# Patient Record
Sex: MALE | Race: WHITE | HISPANIC OR LATINO
[De-identification: names, ages, dates, MRNs, and addresses within clinical notes are randomized per-mention and may not be internally consistent; named-entity substitution may affect disease eponyms.]

---

## 2017-06-12 ENCOUNTER — APPOINTMENT (OUTPATIENT)
Dept: UROLOGY | Facility: CLINIC | Age: 67
End: 2017-06-12

## 2017-06-19 ENCOUNTER — APPOINTMENT (OUTPATIENT)
Dept: ULTRASOUND IMAGING | Facility: IMAGING CENTER | Age: 67
End: 2017-06-19

## 2017-06-19 ENCOUNTER — APPOINTMENT (OUTPATIENT)
Dept: UROLOGY | Facility: CLINIC | Age: 67
End: 2017-06-19

## 2017-08-21 ENCOUNTER — OUTPATIENT (OUTPATIENT)
Dept: OUTPATIENT SERVICES | Facility: HOSPITAL | Age: 67
LOS: 1 days | End: 2017-08-21
Payer: MEDICARE

## 2017-08-21 ENCOUNTER — APPOINTMENT (OUTPATIENT)
Dept: UROLOGY | Facility: CLINIC | Age: 67
End: 2017-08-21
Payer: MEDICARE

## 2017-08-21 DIAGNOSIS — N20.0 CALCULUS OF KIDNEY: ICD-10-CM

## 2017-08-21 DIAGNOSIS — R35.0 FREQUENCY OF MICTURITION: ICD-10-CM

## 2017-08-21 PROCEDURE — 76775 US EXAM ABDO BACK WALL LIM: CPT

## 2017-08-21 PROCEDURE — 99213 OFFICE O/P EST LOW 20 MIN: CPT

## 2018-03-12 ENCOUNTER — APPOINTMENT (OUTPATIENT)
Dept: UROLOGY | Facility: CLINIC | Age: 68
End: 2018-03-12
Payer: MEDICARE

## 2018-03-12 ENCOUNTER — OUTPATIENT (OUTPATIENT)
Dept: OUTPATIENT SERVICES | Facility: HOSPITAL | Age: 68
LOS: 1 days | End: 2018-03-12
Payer: MEDICARE

## 2018-03-12 VITALS
BODY MASS INDEX: 34.55 KG/M2 | SYSTOLIC BLOOD PRESSURE: 133 MMHG | DIASTOLIC BLOOD PRESSURE: 84 MMHG | RESPIRATION RATE: 17 BRPM | WEIGHT: 215 LBS | HEIGHT: 66 IN | HEART RATE: 76 BPM | TEMPERATURE: 98.1 F

## 2018-03-12 DIAGNOSIS — R35.0 FREQUENCY OF MICTURITION: ICD-10-CM

## 2018-03-12 PROCEDURE — 76775 US EXAM ABDO BACK WALL LIM: CPT

## 2018-03-12 PROCEDURE — 99213 OFFICE O/P EST LOW 20 MIN: CPT | Mod: 25

## 2018-03-12 PROCEDURE — 76775 US EXAM ABDO BACK WALL LIM: CPT | Mod: 26

## 2018-03-14 DIAGNOSIS — N20.0 CALCULUS OF KIDNEY: ICD-10-CM

## 2018-03-14 LAB — PSA SERPL-MCNC: 2.24 NG/ML

## 2018-09-17 ENCOUNTER — OUTPATIENT (OUTPATIENT)
Dept: OUTPATIENT SERVICES | Facility: HOSPITAL | Age: 68
LOS: 1 days | End: 2018-09-17
Payer: MEDICARE

## 2018-09-17 ENCOUNTER — APPOINTMENT (OUTPATIENT)
Dept: UROLOGY | Facility: CLINIC | Age: 68
End: 2018-09-17
Payer: MEDICARE

## 2018-09-17 DIAGNOSIS — R35.0 FREQUENCY OF MICTURITION: ICD-10-CM

## 2018-09-17 PROCEDURE — 99213 OFFICE O/P EST LOW 20 MIN: CPT | Mod: 25

## 2018-09-17 PROCEDURE — 76775 US EXAM ABDO BACK WALL LIM: CPT | Mod: 26

## 2018-09-17 PROCEDURE — 76775 US EXAM ABDO BACK WALL LIM: CPT

## 2018-09-19 DIAGNOSIS — N20.0 CALCULUS OF KIDNEY: ICD-10-CM

## 2019-03-18 ENCOUNTER — APPOINTMENT (OUTPATIENT)
Dept: UROLOGY | Facility: CLINIC | Age: 69
End: 2019-03-18

## 2019-06-18 ENCOUNTER — FORM ENCOUNTER (OUTPATIENT)
Age: 69
End: 2019-06-18

## 2019-06-19 ENCOUNTER — OUTPATIENT (OUTPATIENT)
Dept: OUTPATIENT SERVICES | Facility: HOSPITAL | Age: 69
LOS: 1 days | End: 2019-06-19

## 2019-06-19 ENCOUNTER — APPOINTMENT (OUTPATIENT)
Dept: UROLOGY | Facility: CLINIC | Age: 69
End: 2019-06-19
Payer: MEDICARE

## 2019-06-19 ENCOUNTER — APPOINTMENT (OUTPATIENT)
Dept: CT IMAGING | Facility: IMAGING CENTER | Age: 69
End: 2019-06-19
Payer: MEDICARE

## 2019-06-19 ENCOUNTER — OUTPATIENT (OUTPATIENT)
Dept: OUTPATIENT SERVICES | Facility: HOSPITAL | Age: 69
LOS: 1 days | End: 2019-06-19
Payer: MEDICARE

## 2019-06-19 VITALS
HEIGHT: 66 IN | TEMPERATURE: 97.9 F | BODY MASS INDEX: 37.77 KG/M2 | WEIGHT: 235 LBS | SYSTOLIC BLOOD PRESSURE: 130 MMHG | RESPIRATION RATE: 17 BRPM | HEART RATE: 66 BPM | DIASTOLIC BLOOD PRESSURE: 73 MMHG

## 2019-06-19 DIAGNOSIS — N13.8 BENIGN PROSTATIC HYPERPLASIA WITH LOWER URINARY TRACT SYMPMS: ICD-10-CM

## 2019-06-19 DIAGNOSIS — R35.0 FREQUENCY OF MICTURITION: ICD-10-CM

## 2019-06-19 DIAGNOSIS — N20.0 CALCULUS OF KIDNEY: ICD-10-CM

## 2019-06-19 DIAGNOSIS — N40.1 BENIGN PROSTATIC HYPERPLASIA WITH LOWER URINARY TRACT SYMPMS: ICD-10-CM

## 2019-06-19 PROCEDURE — 74176 CT ABD & PELVIS W/O CONTRAST: CPT

## 2019-06-19 PROCEDURE — 76775 US EXAM ABDO BACK WALL LIM: CPT

## 2019-06-19 PROCEDURE — 99213 OFFICE O/P EST LOW 20 MIN: CPT | Mod: 25

## 2019-06-19 PROCEDURE — 76775 US EXAM ABDO BACK WALL LIM: CPT | Mod: 26

## 2019-06-19 PROCEDURE — 74176 CT ABD & PELVIS W/O CONTRAST: CPT | Mod: 26

## 2019-06-19 NOTE — ADDENDUM
[FreeTextEntry1] :  I, Maryam Jimenez, acted solely as a scribe for Dr. Andrew Duran. The documentation recorded by the scribe accurately reflects the service I personally performed and the decision by me.\par

## 2019-06-19 NOTE — HISTORY OF PRESENT ILLNESS
[FreeTextEntry1] : 68 year old male presents for evaluation of test results.\par US renal today shows multiple renal calculi visualized in the right kidney. There is a stone in the midpole measuring 9.9 mm, one in the upper pole measuring 4.4 mm, and another in the lower pole measuring 4.6 mm. He has bilateral simple cysts as well of no significance.\par LL from 05/22/19 shows good output, but his Ca levels have increased and he is SS CaP.\par On K Citrate 1 tablet BID\par CT scan today shows an 8.5 mm in the right mid-upper pole in addition to 2 stones measuring 6-7 mm each in the left mid pole.\par \par Post TURP 7 years ago.\par Nocturia x0, occasionally x1\par [place].\par Occasional urgency.\par PSA 2.24 in March 2018\par \par Repeat PSA today\par Risks, benefits and alternatives of URS discussed. \par Risk of infection, multiple procedures, ureteral injury, ureteral stricture, incomplete stones clearance, sepsis, bleeding, retention, all discussed. \par Will schedule right URS\par Give repeat LL form on return visit.

## 2019-06-20 LAB — PSA SERPL-MCNC: 2.18 NG/ML

## 2019-06-21 ENCOUNTER — OUTPATIENT (OUTPATIENT)
Dept: OUTPATIENT SERVICES | Facility: HOSPITAL | Age: 69
LOS: 1 days | End: 2019-06-21
Payer: MEDICARE

## 2019-06-21 VITALS
OXYGEN SATURATION: 96 % | DIASTOLIC BLOOD PRESSURE: 86 MMHG | WEIGHT: 225.09 LBS | HEIGHT: 66 IN | TEMPERATURE: 98 F | SYSTOLIC BLOOD PRESSURE: 147 MMHG | RESPIRATION RATE: 16 BRPM | HEART RATE: 88 BPM

## 2019-06-21 DIAGNOSIS — N40.1 BENIGN PROSTATIC HYPERPLASIA WITH LOWER URINARY TRACT SYMPTOMS: ICD-10-CM

## 2019-06-21 DIAGNOSIS — N20.0 CALCULUS OF KIDNEY: ICD-10-CM

## 2019-06-21 DIAGNOSIS — I25.10 ATHEROSCLEROTIC HEART DISEASE OF NATIVE CORONARY ARTERY WITHOUT ANGINA PECTORIS: ICD-10-CM

## 2019-06-21 DIAGNOSIS — E11.9 TYPE 2 DIABETES MELLITUS WITHOUT COMPLICATIONS: ICD-10-CM

## 2019-06-21 LAB
ANION GAP SERPL CALC-SCNC: 15 MMOL/L — SIGNIFICANT CHANGE UP (ref 5–17)
BUN SERPL-MCNC: 18 MG/DL — SIGNIFICANT CHANGE UP (ref 7–23)
CALCIUM SERPL-MCNC: 9.9 MG/DL — SIGNIFICANT CHANGE UP (ref 8.4–10.5)
CHLORIDE SERPL-SCNC: 101 MMOL/L — SIGNIFICANT CHANGE UP (ref 96–108)
CO2 SERPL-SCNC: 23 MMOL/L — SIGNIFICANT CHANGE UP (ref 22–31)
CREAT SERPL-MCNC: 0.86 MG/DL — SIGNIFICANT CHANGE UP (ref 0.5–1.3)
GLUCOSE SERPL-MCNC: 113 MG/DL — HIGH (ref 70–99)
HCT VFR BLD CALC: 44.4 % — SIGNIFICANT CHANGE UP (ref 39–50)
HGB BLD-MCNC: 14 G/DL — SIGNIFICANT CHANGE UP (ref 13–17)
MCHC RBC-ENTMCNC: 27.6 PG — SIGNIFICANT CHANGE UP (ref 27–34)
MCHC RBC-ENTMCNC: 31.5 GM/DL — LOW (ref 32–36)
MCV RBC AUTO: 87.4 FL — SIGNIFICANT CHANGE UP (ref 80–100)
PLATELET # BLD AUTO: 295 K/UL — SIGNIFICANT CHANGE UP (ref 150–400)
POTASSIUM SERPL-MCNC: 4.4 MMOL/L — SIGNIFICANT CHANGE UP (ref 3.5–5.3)
POTASSIUM SERPL-SCNC: 4.4 MMOL/L — SIGNIFICANT CHANGE UP (ref 3.5–5.3)
RBC # BLD: 5.08 M/UL — SIGNIFICANT CHANGE UP (ref 4.2–5.8)
RBC # FLD: 15 % — HIGH (ref 10.3–14.5)
SODIUM SERPL-SCNC: 139 MMOL/L — SIGNIFICANT CHANGE UP (ref 135–145)
WBC # BLD: 8.47 K/UL — SIGNIFICANT CHANGE UP (ref 3.8–10.5)
WBC # FLD AUTO: 8.47 K/UL — SIGNIFICANT CHANGE UP (ref 3.8–10.5)

## 2019-06-21 PROCEDURE — 83036 HEMOGLOBIN GLYCOSYLATED A1C: CPT

## 2019-06-21 PROCEDURE — 87086 URINE CULTURE/COLONY COUNT: CPT

## 2019-06-21 PROCEDURE — 85027 COMPLETE CBC AUTOMATED: CPT

## 2019-06-21 PROCEDURE — G0463: CPT

## 2019-06-21 PROCEDURE — 80048 BASIC METABOLIC PNL TOTAL CA: CPT

## 2019-06-21 NOTE — H&P PST ADULT - NSICDXPROBLEM_GEN_ALL_CORE_FT
PROBLEM DIAGNOSES  Problem: Renal calculi  Assessment and Plan: Right ureteroscopy for kidney stone  [facility] sent    Problem: CAD (coronary artery disease)  Assessment and Plan: Instructed to continue meds  asprin to OR    Problem: DM (diabetes mellitus)  Assessment and Plan: Will follow up with labs/ fingerstick.   HgA1c ordered   Instructed to hold metformin, last dose 07/06/19  STAT FS  on the day of surgery ordered
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
## 2019-06-21 NOTE — H&P PST ADULT - PRIMARY CARE PROVIDER
Reymundo gonzalez  , Cardio tari Beebe  , "my doctors are in [place] ( moved from [place] to [place] in 12/2018)
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
## 2019-06-21 NOTE — H&P PST ADULT - NSICDXPASTSURGICALHX_GEN_ALL_CORE_FT
PAST SURGICAL HISTORY:  Kidney stone s/o ureteroscopy, stone extraction    S/P TURP 2012    Stented coronary artery Drug eluting stent placed 1/28/11 in LAD

## 2019-06-21 NOTE — H&P PST ADULT - MUSCULOSKELETAL
details… detailed exam no joint erythema/no calf tenderness/left ankle torn meniscus, brace on/normal strength/decreased ROM due to pain/no joint warmth

## 2019-06-22 LAB
CULTURE RESULTS: NO GROWTH — SIGNIFICANT CHANGE UP
HBA1C BLD-MCNC: 6.2 % — HIGH (ref 4–5.6)
SPECIMEN SOURCE: SIGNIFICANT CHANGE UP

## 2019-06-27 DIAGNOSIS — N40.1 BENIGN PROSTATIC HYPERPLASIA WITH LOWER URINARY TRACT SYMPTOMS: ICD-10-CM

## 2019-06-27 DIAGNOSIS — N20.0 CALCULUS OF KIDNEY: ICD-10-CM

## 2019-07-08 ENCOUNTER — APPOINTMENT (OUTPATIENT)
Dept: UROLOGY | Facility: HOSPITAL | Age: 69
End: 2019-07-08

## 2019-09-03 ENCOUNTER — RX RENEWAL (OUTPATIENT)
Age: 69
End: 2019-09-03

## 2021-12-14 PROBLEM — E11.9 TYPE 2 DIABETES MELLITUS WITHOUT COMPLICATIONS: Chronic | Status: ACTIVE | Noted: 2019-06-21

## 2021-12-14 PROBLEM — K92.2 GASTROINTESTINAL HEMORRHAGE, UNSPECIFIED: Chronic | Status: ACTIVE | Noted: 2019-06-21

## 2021-12-14 PROBLEM — N20.0 CALCULUS OF KIDNEY: Chronic | Status: ACTIVE | Noted: 2019-06-21

## 2021-12-14 PROBLEM — T75.1XXA: Chronic | Status: ACTIVE | Noted: 2019-06-21

## 2021-12-14 PROBLEM — F41.8 OTHER SPECIFIED ANXIETY DISORDERS: Chronic | Status: ACTIVE | Noted: 2019-06-21

## 2021-12-14 PROBLEM — T14.8XXA OTHER INJURY OF UNSPECIFIED BODY REGION, INITIAL ENCOUNTER: Chronic | Status: ACTIVE | Noted: 2019-06-21

## 2022-01-24 ENCOUNTER — APPOINTMENT (OUTPATIENT)
Dept: UROLOGY | Facility: CLINIC | Age: 72
End: 2022-01-24

## 2022-12-11 ENCOUNTER — INPATIENT (INPATIENT)
Facility: HOSPITAL | Age: 72
LOS: 0 days | Discharge: HOME | End: 2022-12-12
Attending: HOSPITALIST | Admitting: HOSPITALIST

## 2022-12-11 VITALS
OXYGEN SATURATION: 100 % | HEART RATE: 100 BPM | RESPIRATION RATE: 16 BRPM | SYSTOLIC BLOOD PRESSURE: 163 MMHG | TEMPERATURE: 98 F | DIASTOLIC BLOOD PRESSURE: 91 MMHG

## 2022-12-11 DIAGNOSIS — J10.1 INFLUENZA DUE TO OTHER IDENTIFIED INFLUENZA VIRUS WITH OTHER RESPIRATORY MANIFESTATIONS: ICD-10-CM

## 2022-12-11 DIAGNOSIS — M10.9 GOUT, UNSPECIFIED: ICD-10-CM

## 2022-12-11 DIAGNOSIS — F41.9 ANXIETY DISORDER, UNSPECIFIED: ICD-10-CM

## 2022-12-11 DIAGNOSIS — Z29.9 ENCOUNTER FOR PROPHYLACTIC MEASURES, UNSPECIFIED: ICD-10-CM

## 2022-12-11 DIAGNOSIS — J45.901 UNSPECIFIED ASTHMA WITH (ACUTE) EXACERBATION: ICD-10-CM

## 2022-12-11 DIAGNOSIS — I25.10 ATHEROSCLEROTIC HEART DISEASE OF NATIVE CORONARY ARTERY WITHOUT ANGINA PECTORIS: ICD-10-CM

## 2022-12-11 DIAGNOSIS — E11.9 TYPE 2 DIABETES MELLITUS WITHOUT COMPLICATIONS: ICD-10-CM

## 2022-12-11 LAB
ALBUMIN SERPL ELPH-MCNC: 4.3 G/DL — SIGNIFICANT CHANGE UP (ref 3.3–5)
ALP SERPL-CCNC: 102 U/L — SIGNIFICANT CHANGE UP (ref 40–120)
ALT FLD-CCNC: 16 U/L — SIGNIFICANT CHANGE UP (ref 4–41)
ANION GAP SERPL CALC-SCNC: 12 MMOL/L — SIGNIFICANT CHANGE UP (ref 7–14)
APTT BLD: 31.7 SEC — SIGNIFICANT CHANGE UP (ref 27–36.3)
AST SERPL-CCNC: 18 U/L — SIGNIFICANT CHANGE UP (ref 4–40)
B PERT DNA SPEC QL NAA+PROBE: SIGNIFICANT CHANGE UP
B PERT+PARAPERT DNA PNL SPEC NAA+PROBE: SIGNIFICANT CHANGE UP
BASE EXCESS BLDV CALC-SCNC: 0.5 MMOL/L — SIGNIFICANT CHANGE UP (ref -2–3)
BASOPHILS # BLD AUTO: 0.02 K/UL — SIGNIFICANT CHANGE UP (ref 0–0.2)
BASOPHILS NFR BLD AUTO: 0.3 % — SIGNIFICANT CHANGE UP (ref 0–2)
BILIRUB SERPL-MCNC: 0.3 MG/DL — SIGNIFICANT CHANGE UP (ref 0.2–1.2)
BLOOD GAS VENOUS COMPREHENSIVE RESULT: SIGNIFICANT CHANGE UP
BORDETELLA PARAPERTUSSIS (RAPRVP): SIGNIFICANT CHANGE UP
BUN SERPL-MCNC: 15 MG/DL — SIGNIFICANT CHANGE UP (ref 7–23)
C PNEUM DNA SPEC QL NAA+PROBE: SIGNIFICANT CHANGE UP
CALCIUM SERPL-MCNC: 9.5 MG/DL — SIGNIFICANT CHANGE UP (ref 8.4–10.5)
CHLORIDE BLDV-SCNC: 101 MMOL/L — SIGNIFICANT CHANGE UP (ref 96–108)
CHLORIDE SERPL-SCNC: 101 MMOL/L — SIGNIFICANT CHANGE UP (ref 98–107)
CO2 BLDV-SCNC: 27.4 MMOL/L — HIGH (ref 22–26)
CO2 SERPL-SCNC: 24 MMOL/L — SIGNIFICANT CHANGE UP (ref 22–31)
CREAT SERPL-MCNC: 0.96 MG/DL — SIGNIFICANT CHANGE UP (ref 0.5–1.3)
EGFR: 83 ML/MIN/1.73M2 — SIGNIFICANT CHANGE UP
EOSINOPHIL # BLD AUTO: 0.01 K/UL — SIGNIFICANT CHANGE UP (ref 0–0.5)
EOSINOPHIL NFR BLD AUTO: 0.1 % — SIGNIFICANT CHANGE UP (ref 0–6)
FLUAV H3 RNA SPEC QL NAA+PROBE: DETECTED
FLUBV RNA SPEC QL NAA+PROBE: SIGNIFICANT CHANGE UP
GAS PNL BLDV: 134 MMOL/L — LOW (ref 136–145)
GLUCOSE BLDC GLUCOMTR-MCNC: 107 MG/DL — HIGH (ref 70–99)
GLUCOSE BLDC GLUCOMTR-MCNC: 208 MG/DL — HIGH (ref 70–99)
GLUCOSE BLDV-MCNC: 162 MG/DL — HIGH (ref 70–99)
GLUCOSE SERPL-MCNC: 159 MG/DL — HIGH (ref 70–99)
HADV DNA SPEC QL NAA+PROBE: SIGNIFICANT CHANGE UP
HCO3 BLDV-SCNC: 26 MMOL/L — SIGNIFICANT CHANGE UP (ref 22–29)
HCOV 229E RNA SPEC QL NAA+PROBE: SIGNIFICANT CHANGE UP
HCOV HKU1 RNA SPEC QL NAA+PROBE: SIGNIFICANT CHANGE UP
HCOV NL63 RNA SPEC QL NAA+PROBE: SIGNIFICANT CHANGE UP
HCOV OC43 RNA SPEC QL NAA+PROBE: SIGNIFICANT CHANGE UP
HCT VFR BLD CALC: 48 % — SIGNIFICANT CHANGE UP (ref 39–50)
HCT VFR BLDA CALC: 47 % — SIGNIFICANT CHANGE UP (ref 39–51)
HGB BLD CALC-MCNC: 15.8 G/DL — SIGNIFICANT CHANGE UP (ref 13–17)
HGB BLD-MCNC: 15.5 G/DL — SIGNIFICANT CHANGE UP (ref 13–17)
HMPV RNA SPEC QL NAA+PROBE: SIGNIFICANT CHANGE UP
HPIV1 RNA SPEC QL NAA+PROBE: SIGNIFICANT CHANGE UP
HPIV2 RNA SPEC QL NAA+PROBE: SIGNIFICANT CHANGE UP
HPIV3 RNA SPEC QL NAA+PROBE: SIGNIFICANT CHANGE UP
HPIV4 RNA SPEC QL NAA+PROBE: SIGNIFICANT CHANGE UP
IANC: 5.81 K/UL — SIGNIFICANT CHANGE UP (ref 1.8–7.4)
IMM GRANULOCYTES NFR BLD AUTO: 0.4 % — SIGNIFICANT CHANGE UP (ref 0–0.9)
INR BLD: 1.22 RATIO — HIGH (ref 0.88–1.16)
LACTATE BLDV-MCNC: 1.2 MMOL/L — SIGNIFICANT CHANGE UP (ref 0.5–2)
LYMPHOCYTES # BLD AUTO: 0.56 K/UL — LOW (ref 1–3.3)
LYMPHOCYTES # BLD AUTO: 7.8 % — LOW (ref 13–44)
M PNEUMO DNA SPEC QL NAA+PROBE: SIGNIFICANT CHANGE UP
MCHC RBC-ENTMCNC: 28 PG — SIGNIFICANT CHANGE UP (ref 27–34)
MCHC RBC-ENTMCNC: 32.3 GM/DL — SIGNIFICANT CHANGE UP (ref 32–36)
MCV RBC AUTO: 86.6 FL — SIGNIFICANT CHANGE UP (ref 80–100)
MONOCYTES # BLD AUTO: 0.76 K/UL — SIGNIFICANT CHANGE UP (ref 0–0.9)
MONOCYTES NFR BLD AUTO: 10.6 % — SIGNIFICANT CHANGE UP (ref 2–14)
NEUTROPHILS # BLD AUTO: 5.81 K/UL — SIGNIFICANT CHANGE UP (ref 1.8–7.4)
NEUTROPHILS NFR BLD AUTO: 80.8 % — HIGH (ref 43–77)
NRBC # BLD: 0 /100 WBCS — SIGNIFICANT CHANGE UP (ref 0–0)
NRBC # FLD: 0 K/UL — SIGNIFICANT CHANGE UP (ref 0–0)
PCO2 BLDV: 44 MMHG — SIGNIFICANT CHANGE UP (ref 42–55)
PH BLDV: 7.38 — SIGNIFICANT CHANGE UP (ref 7.32–7.43)
PLATELET # BLD AUTO: 235 K/UL — SIGNIFICANT CHANGE UP (ref 150–400)
PO2 BLDV: 55 MMHG — SIGNIFICANT CHANGE UP
POTASSIUM BLDV-SCNC: 3.7 MMOL/L — SIGNIFICANT CHANGE UP (ref 3.5–5.1)
POTASSIUM SERPL-MCNC: 4 MMOL/L — SIGNIFICANT CHANGE UP (ref 3.5–5.3)
POTASSIUM SERPL-SCNC: 4 MMOL/L — SIGNIFICANT CHANGE UP (ref 3.5–5.3)
PROT SERPL-MCNC: 7 G/DL — SIGNIFICANT CHANGE UP (ref 6–8.3)
PROTHROM AB SERPL-ACNC: 14.2 SEC — HIGH (ref 10.5–13.4)
RAPID RVP RESULT: DETECTED
RBC # BLD: 5.54 M/UL — SIGNIFICANT CHANGE UP (ref 4.2–5.8)
RBC # FLD: 12.8 % — SIGNIFICANT CHANGE UP (ref 10.3–14.5)
RSV RNA SPEC QL NAA+PROBE: SIGNIFICANT CHANGE UP
RV+EV RNA SPEC QL NAA+PROBE: SIGNIFICANT CHANGE UP
SAO2 % BLDV: 85.3 % — SIGNIFICANT CHANGE UP
SARS-COV-2 RNA SPEC QL NAA+PROBE: SIGNIFICANT CHANGE UP
SODIUM SERPL-SCNC: 137 MMOL/L — SIGNIFICANT CHANGE UP (ref 135–145)
WBC # BLD: 7.19 K/UL — SIGNIFICANT CHANGE UP (ref 3.8–10.5)
WBC # FLD AUTO: 7.19 K/UL — SIGNIFICANT CHANGE UP (ref 3.8–10.5)

## 2022-12-11 PROCEDURE — 71045 X-RAY EXAM CHEST 1 VIEW: CPT | Mod: 26

## 2022-12-11 PROCEDURE — 99223 1ST HOSP IP/OBS HIGH 75: CPT

## 2022-12-11 PROCEDURE — 99285 EMERGENCY DEPT VISIT HI MDM: CPT

## 2022-12-11 PROCEDURE — 71275 CT ANGIOGRAPHY CHEST: CPT | Mod: 26,MA

## 2022-12-11 RX ORDER — ACETAMINOPHEN 500 MG
650 TABLET ORAL EVERY 6 HOURS
Refills: 0 | Status: DISCONTINUED | OUTPATIENT
Start: 2022-12-11 | End: 2022-12-12

## 2022-12-11 RX ORDER — VANCOMYCIN HCL 1 G
1000 VIAL (EA) INTRAVENOUS ONCE
Refills: 0 | Status: DISCONTINUED | OUTPATIENT
Start: 2022-12-11 | End: 2022-12-11

## 2022-12-11 RX ORDER — SODIUM CHLORIDE 9 MG/ML
1000 INJECTION, SOLUTION INTRAVENOUS
Refills: 0 | Status: DISCONTINUED | OUTPATIENT
Start: 2022-12-11 | End: 2022-12-12

## 2022-12-11 RX ORDER — DEXTROSE 50 % IN WATER 50 %
15 SYRINGE (ML) INTRAVENOUS ONCE
Refills: 0 | Status: DISCONTINUED | OUTPATIENT
Start: 2022-12-11 | End: 2022-12-12

## 2022-12-11 RX ORDER — ACETAMINOPHEN 500 MG
1000 TABLET ORAL ONCE
Refills: 0 | Status: COMPLETED | OUTPATIENT
Start: 2022-12-11 | End: 2022-12-11

## 2022-12-11 RX ORDER — DEXTROSE 50 % IN WATER 50 %
25 SYRINGE (ML) INTRAVENOUS ONCE
Refills: 0 | Status: DISCONTINUED | OUTPATIENT
Start: 2022-12-11 | End: 2022-12-12

## 2022-12-11 RX ORDER — GLUCAGON INJECTION, SOLUTION 0.5 MG/.1ML
1 INJECTION, SOLUTION SUBCUTANEOUS ONCE
Refills: 0 | Status: DISCONTINUED | OUTPATIENT
Start: 2022-12-11 | End: 2022-12-12

## 2022-12-11 RX ORDER — POTASSIUM CITRATE MONOHYDRATE 100 %
1 POWDER (GRAM) MISCELLANEOUS
Qty: 0 | Refills: 0 | DISCHARGE

## 2022-12-11 RX ORDER — ESCITALOPRAM OXALATE 10 MG/1
1 TABLET, FILM COATED ORAL
Qty: 0 | Refills: 0 | DISCHARGE

## 2022-12-11 RX ORDER — ASPIRIN/CALCIUM CARB/MAGNESIUM 324 MG
1 TABLET ORAL
Qty: 0 | Refills: 0 | DISCHARGE

## 2022-12-11 RX ORDER — OMEGA-3 ACID ETHYL ESTERS 1 G
0 CAPSULE ORAL
Qty: 0 | Refills: 0 | DISCHARGE

## 2022-12-11 RX ORDER — DEXTROSE 50 % IN WATER 50 %
12.5 SYRINGE (ML) INTRAVENOUS ONCE
Refills: 0 | Status: DISCONTINUED | OUTPATIENT
Start: 2022-12-11 | End: 2022-12-12

## 2022-12-11 RX ORDER — POTASSIUM CITRATE MONOHYDRATE 100 %
2 POWDER (GRAM) MISCELLANEOUS
Qty: 0 | Refills: 0 | DISCHARGE

## 2022-12-11 RX ORDER — SODIUM CHLORIDE 9 MG/ML
1000 INJECTION, SOLUTION INTRAVENOUS ONCE
Refills: 0 | Status: COMPLETED | OUTPATIENT
Start: 2022-12-11 | End: 2022-12-11

## 2022-12-11 RX ORDER — AMLODIPINE BESYLATE 2.5 MG/1
5 TABLET ORAL DAILY
Refills: 0 | Status: DISCONTINUED | OUTPATIENT
Start: 2022-12-11 | End: 2022-12-12

## 2022-12-11 RX ORDER — AMLODIPINE BESYLATE 2.5 MG/1
1 TABLET ORAL
Qty: 0 | Refills: 0 | DISCHARGE

## 2022-12-11 RX ORDER — FLUTICASONE PROPIONATE 220 MCG
1 AEROSOL WITH ADAPTER (GRAM) INHALATION
Qty: 0 | Refills: 0 | DISCHARGE

## 2022-12-11 RX ORDER — INSULIN LISPRO 100/ML
VIAL (ML) SUBCUTANEOUS AT BEDTIME
Refills: 0 | Status: DISCONTINUED | OUTPATIENT
Start: 2022-12-11 | End: 2022-12-12

## 2022-12-11 RX ORDER — CEFTRIAXONE 500 MG/1
1000 INJECTION, POWDER, FOR SOLUTION INTRAMUSCULAR; INTRAVENOUS ONCE
Refills: 0 | Status: COMPLETED | OUTPATIENT
Start: 2022-12-11 | End: 2022-12-11

## 2022-12-11 RX ORDER — IPRATROPIUM/ALBUTEROL SULFATE 18-103MCG
3 AEROSOL WITH ADAPTER (GRAM) INHALATION EVERY 6 HOURS
Refills: 0 | Status: DISCONTINUED | OUTPATIENT
Start: 2022-12-11 | End: 2022-12-12

## 2022-12-11 RX ORDER — ASPIRIN/CALCIUM CARB/MAGNESIUM 324 MG
81 TABLET ORAL
Refills: 0 | Status: DISCONTINUED | OUTPATIENT
Start: 2022-12-11 | End: 2022-12-12

## 2022-12-11 RX ORDER — AZITHROMYCIN 500 MG/1
500 TABLET, FILM COATED ORAL ONCE
Refills: 0 | Status: COMPLETED | OUTPATIENT
Start: 2022-12-11 | End: 2022-12-11

## 2022-12-11 RX ORDER — ENOXAPARIN SODIUM 100 MG/ML
40 INJECTION SUBCUTANEOUS EVERY 24 HOURS
Refills: 0 | Status: DISCONTINUED | OUTPATIENT
Start: 2022-12-11 | End: 2022-12-12

## 2022-12-11 RX ORDER — SODIUM CHLORIDE 9 MG/ML
1000 INJECTION INTRAMUSCULAR; INTRAVENOUS; SUBCUTANEOUS
Refills: 0 | Status: DISCONTINUED | OUTPATIENT
Start: 2022-12-11 | End: 2022-12-12

## 2022-12-11 RX ORDER — INSULIN LISPRO 100/ML
VIAL (ML) SUBCUTANEOUS
Refills: 0 | Status: DISCONTINUED | OUTPATIENT
Start: 2022-12-11 | End: 2022-12-11

## 2022-12-11 RX ORDER — LOSARTAN POTASSIUM 100 MG/1
1 TABLET, FILM COATED ORAL
Qty: 0 | Refills: 0 | DISCHARGE

## 2022-12-11 RX ORDER — INSULIN LISPRO 100/ML
VIAL (ML) SUBCUTANEOUS AT BEDTIME
Refills: 0 | Status: DISCONTINUED | OUTPATIENT
Start: 2022-12-11 | End: 2022-12-11

## 2022-12-11 RX ORDER — INSULIN LISPRO 100/ML
VIAL (ML) SUBCUTANEOUS
Refills: 0 | Status: DISCONTINUED | OUTPATIENT
Start: 2022-12-11 | End: 2022-12-12

## 2022-12-11 RX ORDER — ESCITALOPRAM OXALATE 10 MG/1
20 TABLET, FILM COATED ORAL DAILY
Refills: 0 | Status: DISCONTINUED | OUTPATIENT
Start: 2022-12-11 | End: 2022-12-12

## 2022-12-11 RX ADMIN — Medication 400 MILLIGRAM(S): at 16:11

## 2022-12-11 RX ADMIN — AZITHROMYCIN 255 MILLIGRAM(S): 500 TABLET, FILM COATED ORAL at 12:53

## 2022-12-11 RX ADMIN — Medication 3 MILLILITER(S): at 17:38

## 2022-12-11 RX ADMIN — CEFTRIAXONE 100 MILLIGRAM(S): 500 INJECTION, POWDER, FOR SOLUTION INTRAMUSCULAR; INTRAVENOUS at 13:24

## 2022-12-11 RX ADMIN — SODIUM CHLORIDE 100 MILLILITER(S): 9 INJECTION INTRAMUSCULAR; INTRAVENOUS; SUBCUTANEOUS at 17:38

## 2022-12-11 RX ADMIN — SODIUM CHLORIDE 1000 MILLILITER(S): 9 INJECTION, SOLUTION INTRAVENOUS at 08:52

## 2022-12-11 RX ADMIN — AMLODIPINE BESYLATE 5 MILLIGRAM(S): 2.5 TABLET ORAL at 21:50

## 2022-12-11 RX ADMIN — ESCITALOPRAM OXALATE 20 MILLIGRAM(S): 10 TABLET, FILM COATED ORAL at 21:50

## 2022-12-11 RX ADMIN — Medication 125 MILLIGRAM(S): at 17:38

## 2022-12-11 RX ADMIN — Medication 3 MILLILITER(S): at 21:50

## 2022-12-11 NOTE — H&P ADULT - NS ATTEND AMEND GEN_ALL_CORE FT
73 yo male PMHx BPH (s/p TURP,) CAD (s/p stent 2011,) HTN, HLD, Asthma, DM2 (non-insulin dependent,) Kidneys Stones, Obesity and Gout p/w generalized weakness, cough and difficulty breathing. Wife at bedside that patient symptoms started on Friday and was having increase fatigue with associated productive cough. Patient with sick contacts as grandchildren have been tested positive for adenovirus. Wife endorse Tmax 102,7 at home and measure oxygen with pulse oximetry and  noted to be 84%, [facility] O2 documented at 91%. On presentation patient noted to have O2 saturations was given 4L O2, CTA negative for PE, only notable for atelectasis, no consolidations noted. RVP positive for Influenza A, patient and wife refused Tamiflu as do not believe in vaccines or antiviral mediations, prefer wholistic remedies.   Patient received 125 IV solumedrol and Duoneb and patient endorses improvement.    #Flu Positive   - Likely asthma exacerbation in setting of Influenza, received 125 IV solumedrol   - continue with 40mg prednisone for 5 days   - patient refusing Tamiflu at this time   - Bcx x2 pending, received ceftriaxone and azithromycin ED, CT scan with no consolidation or concern for pna  - will monitor off abx at this time, if patient decompensate can reconsider   - Duonebs q6hrs   - supportive management, monitor on pulse ox, wean O2 as tolerated       Rest of plan as stated above. Discussed with Guzman Desir NP

## 2022-12-11 NOTE — H&P ADULT - NSHPLABSRESULTS_GEN_ALL_CORE
Rapid RVP: +Influenza AH1  CXR: Bilateral mid lung field linear opacities, compatible with linear atelectasis or scarring. No focal consolidation otherwise.  CTPA: No pulmonary embolism to the segmental level.  EKG: NSR @ 97bpm QTC 487ms                        15.5   7.19  )-----------( 235      ( 11 Dec 2022 08:43 )             48.0     12-11    137  |  101  |  15  ----------------------------<  159<H>  4.0   |  24  |  0.96    Ca    9.5      11 Dec 2022 08:43    TPro  7.0  /  Alb  4.3  /  TBili  0.3  /  DBili  x   /  AST  18  /  ALT  16  /  AlkPhos  102  12-11        PT/INR - ( 11 Dec 2022 08:43 )   PT: 14.2 sec;   INR: 1.22 ratio         PTT - ( 11 Dec 2022 08:43 )  PTT:31.7 sec  LIVER FUNCTIONS - ( 11 Dec 2022 08:43 )  Alb: 4.3 g/dL / Pro: 7.0 g/dL / ALK PHOS: 102 U/L / ALT: 16 U/L / AST: 18 U/L / GGT: x           CAPILLARY BLOOD GLUCOSE      POCT Blood Glucose.: 107 mg/dL (11 Dec 2022 17:16)  POCT Blood Glucose.: 143 mg/dL (11 Dec 2022 07:40)

## 2022-12-11 NOTE — ED PROVIDER NOTE - PROGRESS NOTE DETAILS
Called patients pmd dr. daniel bhagat at [phone] at patients wife request prior to cta. Results thus far d/w him and he is in agreement with plan to proceed to cta for further eval of hypoxia. discussed this with his wife. Debbie Pollock PGY-3 patient having desatting episodes on RA down to 88%, on 2-4L as needed. will need admission  FLU A positive, wife refusing tamiflu, will hold off for now. Debbie Pollock PGY-3 patient having desatting episodes on RA down to 88%, on 2-4L as needed. will need admission  FLU A positive, wife declining tamiflu and tylenol, patient deferring to her decision, will hold off for now.  stable on o2-all results d/w them and agreeable to admission.

## 2022-12-11 NOTE — ED PROVIDER NOTE - PHYSICAL EXAMINATION
Vital signs reviewed  GENERAL: Patient nontoxic appearing, NAD  HEAD: NCAT  EYES: Anicteric  ENT: MMM  NECK: Supple, non tender  RESPIRATORY: clear lung sounds, slightly tachypnic to 25.   CARDIOVASCULAR: tachycardic s1s2  ABDOMEN: Soft. Nondistended. Nontender. No guarding or rebound. No CVA tenderness.  MUSCULOSKELETAL/EXTREMITIES: Brisk cap refill. 2+ radial pulses. No leg edema.  SKIN:  Warm and dry  NEURO: AAOx3. No gross FND.  PSYCHIATRIC: Cooperative. Affect appropriate.

## 2022-12-11 NOTE — ED ADULT TRIAGE NOTE - CHIEF COMPLAINT QUOTE
Pt c/o fever, cough SOB since friday. Pt fever tmax 102 at home. Pt has sick  contact at home. No complaints of chest pain, headache, nausea, dizziness, vomiting ,chills verbalized.. Pt c/o fever, cough SOB since friday. Pt fever tmax 102 at home. Pt has sick  contact at home. pt o2 sat 92% room air. sating 98 on 4l NC. No complaints of chest pain, headache, nausea, dizziness, vomiting ,chills verbalized..

## 2022-12-11 NOTE — H&P ADULT - PROBLEM SELECTOR PLAN 1
-pt refused Tamiflu  -Pt with Acute asthma exacerbation in a setting of Influenza: start IV Solumedrol 125mg x1, then IV SoluMedrol 40mg Q8h  -DuoNebs Q6h  -Tylenol PRN pain/fever  -s/p IV NS 1L bolus by ED  -c/w IV fluids hydration with NS @ 100cc/hr x10 hours.  -PT eval -pt refused Tamiflu  -Pt with Acute asthma exacerbation in a setting of Influenza: start IV Solumedrol 125mg x1, then IV  prednisone 40mg   -DuoNebs Q6h  -Tylenol PRN pain/fever  -s/p IV NS 1L bolus by ED  -c/w IV fluids hydration with NS @ 100cc/hr x10 hours.  -PT eval

## 2022-12-11 NOTE — H&P ADULT - PROBLEM SELECTOR PROBLEM 4
DM2 (diabetes mellitus, type 2) FAMILY HISTORY:  Father  Still living? Unknown  Family history of ischemic heart disease (IHD), Age at diagnosis: Age Unknown    Mother  Still living? Unknown  Family history of lung cancer, Age at diagnosis: Age Unknown

## 2022-12-11 NOTE — H&P ADULT - PROBLEM SELECTOR PLAN 2
-in a setting of Influenza  -IV Solumedrol 125mg x1, then IV SoluMedrol 40mg Q8h  -DuoNebs Q6h  -incentive spirometry  -monitor SpO2 Q4h  -VS Q6h  -supplemental O2 PRN -in a setting of Influenza  -IV Solumedrol 125mg x1, then prednisone 40mg   -DuoNebs Q6h  -incentive spirometry  -monitor SpO2 Q4h  -VS Q6h  -supplemental O2 PRN

## 2022-12-11 NOTE — H&P ADULT - ASSESSMENT
73 yo male PMHx BPH (s/p TURP,) CAD (s/p stent 2011,) HTN, HLD, Asthma, DM2 (non-insulin dependent,) Kidneys Stones, Obesity and Gout p/w generalized weakness, cough, fever, chills, malaise, anorexia and difficulty breathing, admitted to medicine for Influenza and Asthma Exacerbation.

## 2022-12-11 NOTE — ED PROVIDER NOTE - CLINICAL SUMMARY MEDICAL DECISION MAKING FREE TEXT BOX
72-year-old male presents with fever cough.  Satting 88 on room air started on 4 L nasal cannula tachypneic to 25 however not noticing any chest pain or shortness of breath lungs clear no leg edema.  Given exam concerning for sepsis secondary to viral or bacterial pulmonary cause versus PE versus ACS versus CHF.  Pending labs and likely admission given new oxygen requirement

## 2022-12-11 NOTE — H&P ADULT - HISTORY OF PRESENT ILLNESS
71 yo male PMHx BPH (s/p TURP,) CAD (s/p stent 2011,) HTN, HLD, Asthma, DM2 (non-insulin dependent,) Kidneys Stones, Obesity and Gout p/w generalized weakness, cough and difficulty breathing. Wife by bedside (former RN,) provided further collateral. Pt visiting [place] from [place], arrived last Monday. Last Tuesday pt was holding his grandchild who was sick with Adenovirus. On Friday pt developed productive cough with green phlegm, fever and chills. Last night pt developed SOB, malaise and anorexia (not eating/drinking anything.) Wife checked his SpO2 sat was 84% on room air. Called [facility] ambulance who checked his O2 sat to be 91% on RA and took him to [facility]. Pt denies headache, sore throat, chest pain, palpitations, wheezing, nausea, vomiting, diarrhea, or abdominal pain.     Pt never been vaccinated for COVID or Flu due to refusal.

## 2022-12-11 NOTE — ED ADULT NURSE NOTE - CHIEF COMPLAINT QUOTE
Pt c/o fever, cough SOB since friday. Pt fever tmax 102 at home. Pt has sick  contact at home. pt o2 sat 92% room air. sating 98 on 4l NC. No complaints of chest pain, headache, nausea, dizziness, vomiting ,chills verbalized..

## 2022-12-11 NOTE — ED PROVIDER NOTE - ATTENDING CONTRIBUTION TO CARE
GEN - NAD; well appearing; A+O x3   HEAD - NC/AT   EYES- PERRL, EOMI  ENT: Airway patent, mmm, Oral cavity and pharynx normal. No inflammation, swelling, exudate, or lesions.  NECK: Neck supple  PULMONARY - CTA b/l, symmetric breath sounds, mild tachypnea when speaking though able to speak in full sentences, comfortable at rest on o2.   CARDIAC -s1s2, RRR, no M,G,R  ABDOMEN - +BS, ND, NT, soft, no guarding, no rebound, no masses   BACK - no CVA tenderness, Normal  spine   EXTREMITIES - FROM, symmetric pulses, capillary refill < 2 seconds, no edema   SKIN - no rash or bruising   NEUROLOGIC - alert, speech clear, no focal deficits  PSYCH -nl mood/affect, nl insight. GEN - NAD; well appearing; A+O x3   HEAD - NC/AT   EYES- PERRL, EOMI  ENT: Airway patent, mmm, Oral cavity and pharynx normal. No inflammation, swelling, exudate, or lesions.  NECK: Neck supple  PULMONARY - CTA b/l, symmetric breath sounds, mild tachypnea when speaking though able to speak in full sentences, comfortable at rest on o2.   CARDIAC -s1s2, RRR, no M,G,R  ABDOMEN - +BS, ND, NT, soft, no guarding, no rebound, no masses   BACK - no CVA tenderness, Normal  spine   EXTREMITIES - FROM, symmetric pulses, capillary refill < 2 seconds, no edema   SKIN - no rash or bruising   NEUROLOGIC - alert, speech clear, no focal deficits  PSYCH -nl mood/affect, nl insight.  [id number]-old male presented to the ED with fevers cough generalized weakness in setting of recent sick contacts exposed to children with adenovirus over the last few days.  Patient states he himself did not feel short of breath but his wife who is a nurse noted he looked like he was having difficulty breathing so she took his oxygen saturation this morning and noted it to be in 80s.  Reports he had COVID a few months ago (not vaccinated) at which time he also had a low oxygen though has not had any issues with that since then.  No headache neck pain chest pain abdominal pain vomiting diarrhea dysuria edema.  On exam patient is AO 3, unlabored on O2 though O2 sat drops when O2 is removed, lungs are clear without any wheezing, mildly tachypneic when talking though was able to complete full sentences.  Extremities are warm and well-perfused, abdomen soft and nontender.  Plan for labs chest x-ray EKG telemetry monitoring continue O2 support evaluate for differential including but not limited to infections (viral versus pneumonia) electrolyte disturbances anemia organ dysfunction, plan discussed with patient and wife at bedside who are agreeable.  Initially discussed possibility of CTA given hypoxia to evaluate for PE however wife would like to wait first for chest x-ray and will decide after that.  Patient defers to wife's decision.

## 2022-12-11 NOTE — ED PROVIDER NOTE - NS ED ROS FT
Constitutional: +fever, chills.  Eyes:  No visual changes  ENMT:  No neck pain  Cardiac:  No chest pain  Respiratory:  +cough  GI:  No nausea, vomiting, diarrhea, abdominal pain.  :  No dysuria, hematuria  MS:  No back pain.  Neuro:  No headache or lightheadedness  Skin:  No skin rash  Except as documented in the HPI,  all other systems are negative.

## 2022-12-11 NOTE — H&P ADULT - NSHPSOCIALHISTORY_GEN_ALL_CORE
Pt , lives with spouse. Denies smoking, drinking or drugs. Former smoker 1ppd x5 years, quit>40 yrs ago. At baseline ambulates independently without assistive device for at least 3-4 blocks.

## 2022-12-11 NOTE — ED PROVIDER NOTE - OBJECTIVE STATEMENT
72 BPH, CAD, gout, HTN, HLD, kidney stones, obesity, stented coronary artery, DM (on metformin, controlled) on ASA and Plavix   c/o cough and feverx 2 days. +sick contacts. T-max of 103 at home has positive contacts for adenovirus at home brought into the ER today by wife due to having a low pulse ox reading at home.  Not vaccinated for COVID.  Has a history of asthma on albuterol no history of intubations in the past due to asthma  pt denies any cp, palpitations, sob, abdominal pain, v/d, dysuria, numbness, back pain

## 2022-12-11 NOTE — ED ADULT NURSE NOTE - OBJECTIVE STATEMENT
pt received spot 23. pt A+Ox3, c/o cough and feverx 2 days. +sick contacts. respirations even and unlabored. denies cp/sob/pain/discomfort. pt received spot 23. pt A+Ox3, c/o cough and fever x 2 days. +sick contacts. respirations even and unlabored. denies cp/sob/pain/discomfort. vs as noted. placed on cm. IVSL in place. labs sent. family at bedside. call bell within reach. will monitor.
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
## 2022-12-12 ENCOUNTER — TRANSCRIPTION ENCOUNTER (OUTPATIENT)
Age: 72
End: 2022-12-12

## 2022-12-12 VITALS
DIASTOLIC BLOOD PRESSURE: 78 MMHG | SYSTOLIC BLOOD PRESSURE: 145 MMHG | OXYGEN SATURATION: 95 % | RESPIRATION RATE: 16 BRPM | HEART RATE: 75 BPM | TEMPERATURE: 98 F

## 2022-12-12 LAB
A1C WITH ESTIMATED AVERAGE GLUCOSE RESULT: 6.3 % — HIGH (ref 4–5.6)
ANION GAP SERPL CALC-SCNC: 13 MMOL/L — SIGNIFICANT CHANGE UP (ref 7–14)
BUN SERPL-MCNC: 20 MG/DL — SIGNIFICANT CHANGE UP (ref 7–23)
CALCIUM SERPL-MCNC: 9.1 MG/DL — SIGNIFICANT CHANGE UP (ref 8.4–10.5)
CHLORIDE SERPL-SCNC: 103 MMOL/L — SIGNIFICANT CHANGE UP (ref 98–107)
CO2 SERPL-SCNC: 23 MMOL/L — SIGNIFICANT CHANGE UP (ref 22–31)
CREAT SERPL-MCNC: 0.74 MG/DL — SIGNIFICANT CHANGE UP (ref 0.5–1.3)
EGFR: 96 ML/MIN/1.73M2 — SIGNIFICANT CHANGE UP
ESTIMATED AVERAGE GLUCOSE: 134 — SIGNIFICANT CHANGE UP
GLUCOSE BLDC GLUCOMTR-MCNC: 173 MG/DL — HIGH (ref 70–99)
GLUCOSE BLDC GLUCOMTR-MCNC: 174 MG/DL — HIGH (ref 70–99)
GLUCOSE SERPL-MCNC: 237 MG/DL — HIGH (ref 70–99)
HCT VFR BLD CALC: 46.6 % — SIGNIFICANT CHANGE UP (ref 39–50)
HCV AB S/CO SERPL IA: 0.05 S/CO — SIGNIFICANT CHANGE UP (ref 0–0.99)
HCV AB SERPL-IMP: SIGNIFICANT CHANGE UP
HGB BLD-MCNC: 15.1 G/DL — SIGNIFICANT CHANGE UP (ref 13–17)
MAGNESIUM SERPL-MCNC: 1.8 MG/DL — SIGNIFICANT CHANGE UP (ref 1.6–2.6)
MCHC RBC-ENTMCNC: 28 PG — SIGNIFICANT CHANGE UP (ref 27–34)
MCHC RBC-ENTMCNC: 32.4 GM/DL — SIGNIFICANT CHANGE UP (ref 32–36)
MCV RBC AUTO: 86.5 FL — SIGNIFICANT CHANGE UP (ref 80–100)
NRBC # BLD: 0 /100 WBCS — SIGNIFICANT CHANGE UP (ref 0–0)
NRBC # FLD: 0 K/UL — SIGNIFICANT CHANGE UP (ref 0–0)
PLATELET # BLD AUTO: 224 K/UL — SIGNIFICANT CHANGE UP (ref 150–400)
POTASSIUM SERPL-MCNC: 3.9 MMOL/L — SIGNIFICANT CHANGE UP (ref 3.5–5.3)
POTASSIUM SERPL-SCNC: 3.9 MMOL/L — SIGNIFICANT CHANGE UP (ref 3.5–5.3)
RBC # BLD: 5.39 M/UL — SIGNIFICANT CHANGE UP (ref 4.2–5.8)
RBC # FLD: 12.8 % — SIGNIFICANT CHANGE UP (ref 10.3–14.5)
SODIUM SERPL-SCNC: 139 MMOL/L — SIGNIFICANT CHANGE UP (ref 135–145)
WBC # BLD: 4.36 K/UL — SIGNIFICANT CHANGE UP (ref 3.8–10.5)
WBC # FLD AUTO: 4.36 K/UL — SIGNIFICANT CHANGE UP (ref 3.8–10.5)

## 2022-12-12 PROCEDURE — 99239 HOSP IP/OBS DSCHRG MGMT >30: CPT

## 2022-12-12 RX ORDER — PANTOPRAZOLE SODIUM 20 MG/1
1 TABLET, DELAYED RELEASE ORAL
Qty: 7 | Refills: 0
Start: 2022-12-12 | End: 2022-12-18

## 2022-12-12 RX ORDER — METFORMIN HYDROCHLORIDE 850 MG/1
1 TABLET ORAL
Qty: 0 | Refills: 0 | DISCHARGE

## 2022-12-12 RX ORDER — ACETAMINOPHEN 500 MG
2 TABLET ORAL
Qty: 0 | Refills: 0 | DISCHARGE
Start: 2022-12-12

## 2022-12-12 RX ORDER — PANTOPRAZOLE SODIUM 20 MG/1
40 TABLET, DELAYED RELEASE ORAL
Refills: 0 | Status: DISCONTINUED | OUTPATIENT
Start: 2022-12-12 | End: 2022-12-12

## 2022-12-12 RX ADMIN — Medication 2: at 12:20

## 2022-12-12 RX ADMIN — AMLODIPINE BESYLATE 5 MILLIGRAM(S): 2.5 TABLET ORAL at 05:29

## 2022-12-12 RX ADMIN — ESCITALOPRAM OXALATE 20 MILLIGRAM(S): 10 TABLET, FILM COATED ORAL at 09:18

## 2022-12-12 RX ADMIN — Medication 3 MILLILITER(S): at 09:00

## 2022-12-12 RX ADMIN — Medication 81 MILLIGRAM(S): at 09:18

## 2022-12-12 RX ADMIN — Medication 40 MILLIGRAM(S): at 05:29

## 2022-12-12 RX ADMIN — Medication 1: at 09:17

## 2022-12-12 NOTE — DISCHARGE NOTE PROVIDER - CARE PROVIDER_API CALL
Primary Care Physician,   Please call and schedule outpatient follow up with your physician within 1 week for ongoing medical management  Phone: (   )    -  Fax: (   )    -  Follow Up Time: 1 week

## 2022-12-12 NOTE — DISCHARGE NOTE PROVIDER - PROVIDER TOKENS
FREE:[LAST:[Primary Care Physician],PHONE:[(   )    -],FAX:[(   )    -],ADDRESS:[Please call and schedule outpatient follow up with your physician within 1 week for ongoing medical management],FOLLOWUP:[1 week]]

## 2022-12-12 NOTE — PROGRESS NOTE ADULT - PROBLEM SELECTOR PLAN 2
-in a setting of Influenza  -IV Solumedrol 125mg x1, then prednisone 40mg, dc home on Prednisone 20mg x 3 more days   -incentive spirometry  -Ambulatory sats 92-95%, pt denies SOB

## 2022-12-12 NOTE — PATIENT PROFILE ADULT - FUNCTIONAL ASSESSMENT - BASIC MOBILITY 6.
3-calculated by average/Not able to assess (calculate score using [facility] averaging method)

## 2022-12-12 NOTE — DISCHARGE NOTE PROVIDER - HOSPITAL COURSE
71 yo male PMHx BPH (s/p TURP,) CAD (s/p stent 2011,) HTN, HLD, Asthma, DM2 (non-insulin dependent,) Kidneys Stones, Obesity and Gout p/w generalized weakness, cough, fever, chills, malaise, anorexia and difficulty breathing, admitted to medicine for Influenza and Asthma Exacerbation.    Influenza A (H1N1).   ·  Plan: -pt refused Tamiflu  -Pt with Acute asthma exacerbation in a setting of Influenza: s/p IV Solumedrol 125mg x1 --> Prednisone 40mg.  Will discharge on Prednisone 20mg x 3 more days, wife requesting lower dose of Prednisone.   -DuoNebs Q6h, pt may resume Albuterol at home   -Tylenol PRN pain/fever  -s/p IV NS 1L bolus by ED.    Acute asthma exacerbation.   ·  Plan: -in a setting of Influenza  -IV Solumedrol 125mg x1, then prednisone 40mg, dc home on Prednisone 20mg x 3 more days   -incentive spirometry  -Ambulatory sats 92-95%, pt denies SOB.    CAD (coronary artery disease).   ·  Plan: continue ASA 81mg 3x/week due to history of GI bleed in the past (home regimen.).    DM2 (diabetes mellitus, type 2).   ·  Plan: Daily glucose monitoring  insulin sliding scale  DASH 1800 ADA diet   A1c 6.3.    Gout.   ·  Plan: Diet controlled.    Anxiety disorder.   ·  Plan: c/w escitalopram.    Patient seen and evaluated. Afebrile, VSS. Reviewed discharge medications with patient and attending. All new medications requiring new prescriptions were sent to the pharmacy of patient's choice. Medically cleared/stable for discharge 12/12  as per Dr. Joe with appropriate follow up. Patient understands and agrees with plan of care.       73 yo male PMHx BPH (s/p TURP,) CAD (s/p stent 2011,) HTN, HLD, Asthma, DM2 (non-insulin dependent,) Kidneys Stones, Obesity and Gout p/w generalized weakness, cough, fever, chills, malaise, anorexia and difficulty breathing, admitted to medicine for acute hypoxic respiratory failure in setting of Influenza and Asthma Exacerbation.  Pt refused Tamiflu; he was given duonebs around the clock and Solumedrol 125mg IV x1 and then resumed on Prednisone 40mg.  Pt and wife expressed concern about being on prednisone due to history of GIB - was in agreement with lowering dose to Prednisone 20mg x 3 more days.  On day of discharge, pt felt much improved and was saturating 92-95% at rest and with ambulation.  Pt was instructed to resume Prednisone at home, Albuterol inhaler PRN and was advised to recover from acute viral illness prior to flying back home to [place].

## 2022-12-12 NOTE — DISCHARGE NOTE PROVIDER - NSDCMRMEDTOKEN_GEN_ALL_CORE_FT
acetaminophen 325 mg oral tablet: 2 tab(s) orally every 6 hours, As needed, Temp greater or equal to 38C (100.4F), Mild Pain (1 - 3)  amLODIPine 5 mg oral tablet: 1 tab(s) orally once a day  Arnuity Ellipta furoate 50 mcg inhalation powder: 1 puff(s) inhaled every 24 hours  aspirin 81 mg oral tablet: 1 tab(s) orally 3 times a week (Mon, Wed, Fri)  escitalopram 20 mg oral tablet: 1 tab(s) orally once a day  metFORMIN 500 mg oral tablet: 1 tab(s) orally 2 times a day resume on 12/15 in the morning  pantoprazole 40 mg oral delayed release tablet: 1 tab(s) orally once a day (before a meal)  potassium citrate 10 mEq oral tablet, extended release: 2 tab(s) orally once a day (in the morning)  potassium citrate 10 mEq oral tablet, extended release: 1 tab(s) orally once a day (at bedtime)  predniSONE 20 mg oral tablet: 1 tab(s) orally once a day

## 2022-12-12 NOTE — DISCHARGE NOTE PROVIDER - NSDCCPCAREPLAN_GEN_ALL_CORE_FT
PRINCIPAL DISCHARGE DIAGNOSIS  Diagnosis: Influenza A  Assessment and Plan of Treatment: You tested positive for the flu. You declined Tamiflu. Continue on prednisone 20 mg for 3 days, along with pantoprazole to protect your stomach. Remember to isolate from others, when around others please wear a mask. Maintain adequate hydration. Allow for rest periods, though make sure to get up and walk around, do not stay sedentary, do not remain in bed at home. Please follow up with your primary care physician regarding your hospitalization within 1 week for ongoing medical management.        SECONDARY DISCHARGE DIAGNOSES  Diagnosis: DM2 (diabetes mellitus, type 2)  Assessment and Plan of Treatment: Your A1c is 6.3  Resume your Metformin on 12/15/2022  Continue your medication regimen and a consistent carbohydrate diet (Meaning eating the same amount of carbohydrates at the same time each day). DASH 1800 Ricky ADA diet .Monitor blood glucose levels throughout the day before meals and at bedtime. Record blood sugars and bring to outpatient providers appointment in order to be reviewed by your doctor for management modifications. If your sugars are more than 400 or less than 70 you should contact your PCP immediately. Monitor for signs/symptoms of low blood glucose, such as, dizziness, altered mental status, or cool/clammy skin. In addition, monitor for signs/symptoms of high blood glucose, such as, feeling hot, dry, fatigued, or with increased thirst/urination. Make regular podiatry appointments in order to have feet checked for wounds and uncontrolled toe nail growth to prevent infections, as well as, appointments with an ophthalmologist to monitor your vision.      Diagnosis: CAD (coronary artery disease)  Assessment and Plan of Treatment: Continue aspirin , do not stop unless instructed by your physician.  Continue low salt, fat, cholesterol and carbohydrate diet. Follow up with cardiologist and primary care physician's routine appointment.      Diagnosis: Gout  Assessment and Plan of Treatment: Continue to monitor your diet.    Diagnosis: Acute asthma exacerbation  Assessment and Plan of Treatment: Continue current medications as prescribed.  Avoid exposures to environmental allergens such as carpets, pets and both first-hand and second-hand smoking.  During seasonal allergy period, take a shower as soon as you get home and change your clothes immediately.  Follow up your routine medical appointments. The goal is to prevent long-term (chronic) symptoms that interfere with daily living, such as coughing, wheezing or shortness of breath during the night or after exercise.  To be able to participate in all activities of daily living, including work, school, and exercise.  To maintain near normal pulmonary function test and prevent asthma exacerbation.      Diagnosis: Anxiety disorder  Assessment and Plan of Treatment: Continue on  escitalopram. Please follow up with your primary care physician regarding your hospitalization within 1 week of discharge for ongoing medical management      Diagnosis: Kidney stones  Assessment and Plan of Treatment: Continue your medication as directed and please follow-up as an outpatient with your primary care provider for further care and recommendations.

## 2022-12-12 NOTE — DISCHARGE NOTE NURSING/CASE MANAGEMENT/SOCIAL WORK - NSDCPEFALRISK_GEN_ALL_CORE
For information on Fall & Injury Prevention, visit: [email or web address] OR  [email or web address] OR  https://www.cdc.gov/steadi/patient.html

## 2022-12-12 NOTE — PROGRESS NOTE ADULT - SUBJECTIVE AND OBJECTIVE BOX
[facility] Division of Hospital Medicine  Felix Joe DO  Available via MS Teams  In house pager [phone]     SUBJECTIVE / OVERNIGHT EVENTS: Pt feels great, denies SOB, wheezing, CP.  Asking if he can go home.  Had Tm 102 yesterday afternoon, but states that he feels much improved.  Refuses Tamiflu, wife  hesitant to agree to Prednisone, but willing to take lower dose of Prednisone 20mg.      MEDICATIONS  (STANDING):  albuterol/ipratropium for Nebulization 3 milliLiter(s) Nebulizer every 6 hours  amLODIPine   Tablet 5 milliGRAM(s) Oral daily  aspirin enteric coated 81 milliGRAM(s) Oral <User Schedule>  dextrose 5%. 1000 milliLiter(s) (100 mL/Hr) IV Continuous <Continuous>  dextrose 5%. 1000 milliLiter(s) (50 mL/Hr) IV Continuous <Continuous>  dextrose 5%. 1000 milliLiter(s) (100 mL/Hr) IV Continuous <Continuous>  dextrose 5%. 1000 milliLiter(s) (50 mL/Hr) IV Continuous <Continuous>  dextrose 50% Injectable 25 Gram(s) IV Push once  dextrose 50% Injectable 12.5 Gram(s) IV Push once  dextrose 50% Injectable 25 Gram(s) IV Push once  dextrose 50% Injectable 25 Gram(s) IV Push once  dextrose 50% Injectable 12.5 Gram(s) IV Push once  dextrose 50% Injectable 25 Gram(s) IV Push once  enoxaparin Injectable 40 milliGRAM(s) SubCutaneous every 24 hours  escitalopram 20 milliGRAM(s) Oral daily  glucagon  Injectable 1 milliGRAM(s) IntraMuscular once  glucagon  Injectable 1 milliGRAM(s) IntraMuscular once  insulin lispro (ADMELOG) corrective regimen sliding scale   SubCutaneous three times a day before meals  insulin lispro (ADMELOG) corrective regimen sliding scale   SubCutaneous at bedtime  pantoprazole    Tablet 40 milliGRAM(s) Oral before breakfast  sodium chloride 0.9%. 1000 milliLiter(s) (100 mL/Hr) IV Continuous <Continuous>    MEDICATIONS  (PRN):  acetaminophen     Tablet .. 650 milliGRAM(s) Oral every 6 hours PRN Temp greater or equal to 38C (100.4F), Mild Pain (1 - 3)  dextrose Oral Gel 15 Gram(s) Oral once PRN Blood Glucose LESS THAN 70 milliGRAM(s)/deciliter  dextrose Oral Gel 15 Gram(s) Oral once PRN Blood Glucose LESS THAN 70 milliGRAM(s)/deciliter      I&O's Summary      PHYSICAL EXAM:  Vital Signs Last 24 Hrs  T(C): 36.8 (12 Dec 2022 12:58), Max: 38.9 (11 Dec 2022 16:13)  T(F): 98.2 (12 Dec 2022 12:58), Max: 102.1 (11 Dec 2022 16:13)  HR: 70 (12 Dec 2022 12:58) (68 - 95)  BP: 143/84 (12 Dec 2022 12:58) (143/84 - 168/90)  BP(mean): --  RR: 17 (12 Dec 2022 13:39) (16 - 20)  SpO2: 92% (12 Dec 2022 13:39) (92% - 99%)    Parameters below as of 12 Dec 2022 13:39  Patient On (Oxygen Delivery Method): room air      CONSTITUTIONAL: NAD, well-developed, well-groomed  EYES: PERRLA; conjunctiva and sclera clear  RESPIRATORY: Normal respiratory effort; lungs are clear to auscultation bilaterally  CARDIOVASCULAR: Regular rate and rhythm, normal S1 and S2, no murmur/rub/gallop; No lower extremity edema; Peripheral pulses are 2+ bilaterally  ABDOMEN: Nontender to palpation, normoactive bowel sounds, no rebound/guarding  MUSCULOSKELETAL:  No clubbing or cyanosis of digits; no joint swelling or tenderness to palpation  PSYCH: A+O to person, place, and time; affect appropriate  NEUROLOGY: CN 2-12 are intact and symmetric; no gross sensory deficits   SKIN: No rashes; no palpable lesions    LABS:                        15.1   4.36  )-----------( 224      ( 12 Dec 2022 04:57 )             46.6     12-12    139  |  103  |  20  ----------------------------<  237<H>  3.9   |  23  |  0.74    Ca    9.1      12 Dec 2022 04:57  Mg     1.80     12-12    TPro  7.0  /  Alb  4.3  /  TBili  0.3  /  DBili  x   /  AST  18  /  ALT  16  /  AlkPhos  102  12-11    PT/INR - ( 11 Dec 2022 08:43 )   PT: 14.2 sec;   INR: 1.22 ratio         PTT - ( 11 Dec 2022 08:43 )  PTT:31.7 sec          Culture - Blood (collected 11 Dec 2022 08:40)  Source: .Blood Blood-Peripheral  Preliminary Report (12 Dec 2022 10:01):    No growth to date.    Culture - Blood (collected 11 Dec 2022 08:20)  Source: .Blood Blood-Peripheral  Preliminary Report (12 Dec 2022 10:01):    No growth to date.      SARS-CoV-2: NotDetec (11 Dec 2022 11:24)      RADIOLOGY & ADDITIONAL TESTS:  New Results Reviewed Today:   < from: Xray Chest 1 View- PORTABLE-Urgent (12.11.22 @ 09:19) >    IMPRESSION:  Bilateral mid lung field linear opacities, compatible with linear   atelectasis or scarring.  No focal consolidation otherwise.    < end of copied text >    COMMUNICATION:  Discussions with Patient/Family: Wife at bedside

## 2022-12-12 NOTE — PATIENT PROFILE ADULT - FALL HARM RISK - HARM RISK INTERVENTIONS
Assistance with ambulation/Assistance OOB with selected safe patient handling equipment/Communicate Risk of Fall with Harm to all staff/Discuss with provider need for PT consult/Monitor gait and stability/Reinforce activity limits and safety measures with patient and family/Tailored Fall Risk Interventions/Visual Cue: Yellow wristband and red socks/Bed in lowest position, wheels locked, appropriate side rails in place/Call bell, personal items and telephone in reach/Instruct patient to call for assistance before getting out of bed or chair/Non-slip footwear when patient is out of bed/[place] to call system/Physically safe environment - no spills, clutter or unnecessary equipment/Purposeful Proactive Rounding/Room/bathroom lighting operational, light cord in reach

## 2022-12-12 NOTE — PROGRESS NOTE ADULT - ASSESSMENT
71 yo male PMHx BPH (s/p TURP,) CAD (s/p stent 2011,) HTN, HLD, Asthma, DM2 (non-insulin dependent,) Kidneys Stones, Obesity and Gout p/w generalized weakness, cough, fever, chills, malaise, anorexia and difficulty breathing, admitted to medicine for Influenza and Asthma Exacerbation.

## 2022-12-12 NOTE — PHYSICAL THERAPY INITIAL EVALUATION ADULT - PERTINENT HX OF CURRENT PROBLEM, REHAB EVAL
72 year old male PMHx BPH (s/p TURP,) CAD (s/p stent 2011,) HTN, HLD, Asthma, DM2 (non-insulin dependent,) Kidneys Stones, Obesity and Gout present with generalized weakness, cough and difficulty breathing.

## 2022-12-12 NOTE — DISCHARGE NOTE NURSING/CASE MANAGEMENT/SOCIAL WORK - PATIENT PORTAL LINK FT
You can access the FollowMyHealth Patient Portal offered by [facility] by registering at the following website: [email or web address]. By joining [facility]’s FollowMyHealth portal, you will also be able to view your health information using other applications (apps) compatible with our system.

## 2022-12-12 NOTE — PROGRESS NOTE ADULT - PROBLEM SELECTOR PLAN 1
-pt refused Tamiflu  -Pt with Acute asthma exacerbation in a setting of Influenza: s/p IV Solumedrol 125mg x1 --> Prednisone 40mg.  Will discharge on Prednisone 20mg x 3 more days, wife requesting lower dose of Prednisone.   -DuoNebs Q6h, pt may resume Albuterol at home   -Tylenol PRN pain/fever  -s/p IV NS 1L bolus by ED

## 2022-12-16 LAB
CULTURE RESULTS: SIGNIFICANT CHANGE UP
CULTURE RESULTS: SIGNIFICANT CHANGE UP
SPECIMEN SOURCE: SIGNIFICANT CHANGE UP
SPECIMEN SOURCE: SIGNIFICANT CHANGE UP

## 2023-02-22 PROBLEM — J45.909 UNSPECIFIED ASTHMA, UNCOMPLICATED: Chronic | Status: ACTIVE | Noted: 2022-12-11

## 2023-03-02 ENCOUNTER — APPOINTMENT (OUTPATIENT)
Dept: UROLOGY | Facility: CLINIC | Age: 73
End: 2023-03-02

## 2024-06-08 NOTE — ED ADULT NURSE REASSESSMENT NOTE - NS ED NURSE REASSESS COMMENT FT1
Pt's body temp increasing to 102.1F, ACP aware. Awaiting orders
Pt a&ox4, sinus on cardiac monitor, medicated as per MD orders. Respirations are even and unlabored, no signs of respiratory distress.
0

## 2024-07-04 NOTE — ED ADULT NURSE NOTE - NS ED NURSE REPORT GIVEN DT
Follow up with gastroenterology on July 9 as previously scheduled.    Be sure to drink 6-8 glasses of plain water daily to prevent dehydration.  Wash your hands diligently and do not prepare foods for others if you are having diarrhea.  
11-Dec-2022 22:17

## 2025-07-03 NOTE — H&P ADULT - ALLERGIC/IMMUNOLOGIC
Name of Medication(s) Requested:  Requested Prescriptions     Pending Prescriptions Disp Refills    gabapentin (NEURONTIN) 300 MG capsule 90 capsule 1     Sig: Take 1 capsule by mouth 3 times daily for 180 doses. 1 capsule in the am 2 capsules in the evening       Medication is on current medication list Yes    Dosage and directions were verified? Yes    Quantity verified: 30 day supply     Pharmacy Verified?  Yes    Last Appointment:  1/31/2025    Future appts:  Future Appointments   Date Time Provider Department Center   7/17/2025  8:00 AM [facility] FAST TRACK 2 [facility]   7/17/2025  9:00 AM [facility] BED 2 [facility]   7/24/2025  8:00 AM [facility] FAST TRACK 2 [facility]   7/24/2025  8:45 AM [facility] CHAIR 2 [facility] . [facility]   7/28/2025 11:00 AM [facility] FAST TRACK 3 [facility]   7/28/2025 11:30 AM Stephen Decker MD MED ONC [facility]   8/1/2025 12:00 PM Chirag Singh MD [facility] CARDIO [facility]        (If no appt send self scheduling link. .[email or web address])  Scheduling request sent?     [] Yes  [] No    Does patient need updated?  [] Yes  [] No  
negative